# Patient Record
Sex: FEMALE | URBAN - METROPOLITAN AREA
[De-identification: names, ages, dates, MRNs, and addresses within clinical notes are randomized per-mention and may not be internally consistent; named-entity substitution may affect disease eponyms.]

---

## 2023-03-13 ENCOUNTER — NURSING HOME VISIT (OUTPATIENT)
Dept: POST ACUTE CARE | Facility: EXTERNAL LOCATION | Age: 88
End: 2023-03-13
Payer: MEDICARE

## 2023-03-13 DIAGNOSIS — I10 ESSENTIAL (PRIMARY) HYPERTENSION: ICD-10-CM

## 2023-03-13 DIAGNOSIS — N18.30 STAGE 3 CHRONIC KIDNEY DISEASE, UNSPECIFIED WHETHER STAGE 3A OR 3B CKD (MULTI): ICD-10-CM

## 2023-03-13 DIAGNOSIS — I48.91 ATRIAL FIBRILLATION, UNSPECIFIED TYPE (MULTI): ICD-10-CM

## 2023-03-13 DIAGNOSIS — R53.1 WEAKNESS GENERALIZED: ICD-10-CM
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
## 2023-03-13 DIAGNOSIS — S42.301D UNSPECIFIED FRACTURE OF SHAFT OF HUMERUS, RIGHT ARM, SUBSEQUENT ENCOUNTER FOR FRACTURE WITH ROUTINE HEALING: ICD-10-CM

## 2023-03-13 PROCEDURE — 99306 1ST NF CARE HIGH MDM 50: CPT | Performed by: INTERNAL MEDICINE

## 2023-03-13 NOTE — LETTER
Patient: Melony Fitzgerald  : 1926    Date: 3/14/23    Subjective  Chief complaint: Melony Fitzgerald is a 89 y.o. female who is a acute skilled care patient being seen and evaluated for multiple medical problems.  Patient presents for Weakness status post fall, and right humerus fracture.    HPI:  76-year-old female patient with past medical history of breast cancer, hypertension, diabetes, CKD, and A-fib presented to ED status post fall.  In ER patient complaining of right arm and shoulder pain.  Patient was found to have hypokalemia.  Imaging showed no closed fracture of right humerus.  Patient was admitted to hospital for further work-up and treatment.  During hospital course patient was seen by orthopedic surgery who recommended right upper extremity sling and no surgical intervention.  Patient was seen by hospital therapy who recommended additional outpatient therapy.  Remainder of hospital course was uncomplicated.  Patient was discharged to skilled nursing facility for weakness, deconditioning, health maintenance.        Review of Systems  All systems reviewed and negative except for what was mentioned in the HPI    Past Medical History:   Diagnosis Date   • Carcinoma in situ of breast    • CKD (chronic kidney disease)    • Diabetes mellitus (CMS/HCC)    • Hypertension    • Hypertensive cardiovascular disease    • Kidney stones    • Paroxysmal atrial fibrillation (CMS/HCC)        Past Surgical History:   Procedure Laterality Date   • HYSTERECTOMY     • MASTECTOMY         Family History   Problem Relation Name Age of Onset   • Lung disease Father         Social History     Socioeconomic History   • Marital status: None     Spouse name: None   • Number of children: None   • Years of education: None   • Highest education level: None   Occupational History   • None   Tobacco Use   • Smoking status: Never   • Smokeless tobacco: Never   Vaping Use   • Vaping status: Never Used   Substance and Sexual Activity    • Alcohol use: Never   • Drug use: Never   • Sexual activity: None   Other Topics Concern   • None   Social History Narrative   • None     Social Determinants of Health     Financial Resource Strain: Not on file   Food Insecurity: Not on file   Transportation Needs: Not on file   Physical Activity: Not on file   Stress: Not on file   Social Connections: Not on file   Intimate Partner Violence: Not on file   Housing Stability: Not on file       Vital signs: 113/56, 97.9, 66, 18    Objective  Physical Exam  Constitutional:       General: She is not in acute distress.  Eyes:      Extraocular Movements: Extraocular movements intact.   Cardiovascular:      Rate and Rhythm: Regular rhythm.   Pulmonary:      Effort: Pulmonary effort is normal.      Breath sounds: Normal breath sounds.   Abdominal:      General: Bowel sounds are normal.      Palpations: Abdomen is soft.   Musculoskeletal:      Cervical back: Neck supple.      Right lower leg: No edema.      Left lower leg: No edema.      Comments: Sling to right upper extremity   Neurological:      Mental Status: She is alert.      Motor: Weakness present.   Psychiatric:         Mood and Affect: Mood normal.         Behavior: Behavior is cooperative.         Assessment/Plan  Problem List Items Addressed This Visit       Chronic kidney disease, stage 3 unspecified (CMS/HCC)     Continue to monitor.         Essential (primary) hypertension     BP stable.  Continue to monitor blood pressure.  Continue antihypertensive meds.         Falls     History of falls.  Positive for weakness.  Continue PT OT         Unspecified atrial fibrillation (CMS/HCC)     A-fib stable.  Continue to monitor.         Unspecified fracture of shaft of humerus, right arm, subsequent encounter for fracture with routine healing     Treated on hospital course.  Continue sling.  Continue therapy.  Pain controlled.Continue as needed Roxicodone as needed         Weakness generalized     Positive for  weakness.  Continue PT OT          Medications, treatments, and labs reviewed  Continue medications and treatments as listed in [facility]        Dillon Nunez MD

## 2023-03-14 PROBLEM — N18.30 CHRONIC KIDNEY DISEASE, STAGE 3 UNSPECIFIED (MULTI): Status: ACTIVE | Noted: 2023-03-14

## 2023-03-14 PROBLEM — E11.9 TYPE 2 DIABETES MELLITUS WITHOUT COMPLICATIONS (MULTI): Status: ACTIVE | Noted: 2023-03-14

## 2023-03-14 PROBLEM — I48.91 UNSPECIFIED ATRIAL FIBRILLATION (MULTI): Status: ACTIVE | Noted: 2023-03-14

## 2023-03-14 PROBLEM — I10 ESSENTIAL (PRIMARY) HYPERTENSION: Status: ACTIVE | Noted: 2023-03-14

## 2023-03-14 PROBLEM — R53.1 WEAKNESS GENERALIZED: Status: ACTIVE | Noted: 2023-03-14

## 2023-03-14 PROBLEM — S42.301D: Status: ACTIVE | Noted: 2023-03-14
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
## 2023-03-14 NOTE — PROGRESS NOTES
Subjective   Chief complaint: Melony Fitzgerald is a 89 y.o. female who is a acute skilled care patient being seen and evaluated for multiple medical problems.  Patient presents for Weakness status post fall, and right humerus fracture.    HPI:  76-year-old female patient with past medical history of breast cancer, hypertension, diabetes, CKD, and A-fib presented to ED status post fall.  In ER patient complaining of right arm and shoulder pain.  Patient was found to have hypokalemia.  Imaging showed no closed fracture of right humerus.  Patient was admitted to hospital for further work-up and treatment.  During hospital course patient was seen by orthopedic surgery who recommended right upper extremity sling and no surgical intervention.  Patient was seen by hospital therapy who recommended additional outpatient therapy.  Remainder of hospital course was uncomplicated.  Patient was discharged to skilled nursing facility for weakness, deconditioning, health maintenance.        Review of Systems  All systems reviewed and negative except for what was mentioned in the HPI    Past Medical History:   Diagnosis Date    Carcinoma in situ of breast     CKD (chronic kidney disease)     Diabetes mellitus (CMS/HCC)     Hypertension     Hypertensive cardiovascular disease     Kidney stones     Paroxysmal atrial fibrillation (CMS/HCC)        Past Surgical History:   Procedure Laterality Date    HYSTERECTOMY      MASTECTOMY         Family History   Problem Relation Name Age of Onset    Lung disease Father         Social History     Socioeconomic History    Marital status: None     Spouse name: None    Number of children: None    Years of education: None    Highest education level: None   Occupational History    None   Tobacco Use    Smoking status: Never    Smokeless tobacco: Never   Vaping Use    Vaping status: Never Used   Substance and Sexual Activity    Alcohol use: Never    Drug use: Never    Sexual activity: None   Other Topics  Concern    None   Social History Narrative    None     Social Determinants of Health     Financial Resource Strain: Not on file   Food Insecurity: Not on file   Transportation Needs: Not on file   Physical Activity: Not on file   Stress: Not on file   Social Connections: Not on file   Intimate Partner Violence: Not on file   Housing Stability: Not on file       Vital signs: 113/56, 97.9, 66, 18    Objective   Physical Exam  Constitutional:       General: She is not in acute distress.  Eyes:      Extraocular Movements: Extraocular movements intact.   Cardiovascular:      Rate and Rhythm: Regular rhythm.   Pulmonary:      Effort: Pulmonary effort is normal.      Breath sounds: Normal breath sounds.   Abdominal:      General: Bowel sounds are normal.      Palpations: Abdomen is soft.   Musculoskeletal:      Cervical back: Neck supple.      Right lower leg: No edema.      Left lower leg: No edema.      Comments: Sling to right upper extremity   Neurological:      Mental Status: She is alert.      Motor: Weakness present.   Psychiatric:         Mood and Affect: Mood normal.         Behavior: Behavior is cooperative.         Assessment/Plan   Problem List Items Addressed This Visit       Chronic kidney disease, stage 3 unspecified (CMS/HCC)     Continue to monitor.         Essential (primary) hypertension     BP stable.  Continue to monitor blood pressure.  Continue antihypertensive meds.         Falls     History of falls.  Positive for weakness.  Continue PT OT         Unspecified atrial fibrillation (CMS/HCC)     A-fib stable.  Continue to monitor.         Unspecified fracture of shaft of humerus, right arm, subsequent encounter for fracture with routine healing     Treated on hospital course.  Continue sling.  Continue therapy.  Pain controlled.Continue as needed Roxicodone as needed         Weakness generalized     Positive for weakness.  Continue PT OT          Medications, treatments, and labs reviewed  Continue  medications and treatments as listed in [facility]

## 2023-03-14 NOTE — ASSESSMENT & PLAN NOTE
Treated on hospital course.  Continue sling.  Continue therapy.  Pain controlled.Continue as needed Roxicodone as needed

## 2023-03-15 ENCOUNTER — NURSING HOME VISIT (OUTPATIENT)
Dept: POST ACUTE CARE | Facility: EXTERNAL LOCATION | Age: 88
End: 2023-03-15
Payer: MEDICARE

## 2023-03-15 DIAGNOSIS — I10 ESSENTIAL (PRIMARY) HYPERTENSION: ICD-10-CM

## 2023-03-15 DIAGNOSIS — E11.9 TYPE 2 DIABETES MELLITUS WITHOUT COMPLICATION, UNSPECIFIED WHETHER LONG TERM INSULIN USE (MULTI): ICD-10-CM

## 2023-03-15 DIAGNOSIS — S42.301D UNSPECIFIED FRACTURE OF SHAFT OF HUMERUS, RIGHT ARM, SUBSEQUENT ENCOUNTER FOR FRACTURE WITH ROUTINE HEALING: ICD-10-CM

## 2023-03-15 DIAGNOSIS — N18.30 STAGE 3 CHRONIC KIDNEY DISEASE, UNSPECIFIED WHETHER STAGE 3A OR 3B CKD (MULTI): Primary | ICD-10-CM

## 2023-03-15 DIAGNOSIS — R53.1 WEAKNESS GENERALIZED: ICD-10-CM
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
## 2023-03-15 DIAGNOSIS — I48.91 ATRIAL FIBRILLATION, UNSPECIFIED TYPE (MULTI): ICD-10-CM

## 2023-03-15 PROCEDURE — 99308 SBSQ NF CARE LOW MDM 20: CPT | Performed by: INTERNAL MEDICINE

## 2023-03-15 NOTE — LETTER
Patient: Melony Fitzgerald  : 1926    Encounter Date: 03/15/2023    PROGRESS NOTE    Subjective  Chief complaint: Melony Fitzgerald is a 89 y.o. female who is a acute skilled care patient being seen and evaluated for weakness.    HPI:  3/15/23     Patient with right humerus fracture with sling remains nonweightbearing to RUE and no ROM to right shoulder.  Therapy working with patient on LE strengthening activities/exercises.  Also working on gait training and transfers.  Patient able to ambulate 82 feet with Paramed cane and contact-guard assist.  Transfers with contact-guard assist.      Objective  Vital signs:   116/75, 97.3, 98%  Physical Exam  Constitutional:       General: She is not in acute distress.  Eyes:      Extraocular Movements: Extraocular movements intact.   Pulmonary:      Effort: Pulmonary effort is normal.   Musculoskeletal:      Cervical back: Neck supple.   Neurological:      Mental Status: She is alert.   Psychiatric:         Mood and Affect: Mood normal.         Behavior: Behavior is cooperative.         Assessment/Plan  Problem List Items Addressed This Visit       Unspecified fracture of shaft of humerus, right arm, subsequent encounter for fracture with routine healing    Unspecified atrial fibrillation (CMS/HCC)    Type 2 diabetes mellitus without complications (CMS/HCC)    Chronic kidney disease, stage 3 unspecified (CMS/HCC) - Primary    Essential (primary) hypertension    Weakness generalized    Falls     Medications, treatments, and labs reviewed  Continue medications and treatments as listed in [facility]    Scribe Attestation  Marlin VAZQUEZ Scribe   attest that this documentation has been prepared under the direction and in the presence of Dmitry Tang DO    Provider Attestation - Scribe documentation  All medical record entries made by the Scribe were at my direction and personally dictated by me. I have reviewed the chart and agree that the record accurately reflects my  personal performance of the history, physical exam, discussion and plan.   Dmitry Tang DO        Electronically Signed By: Dmitry Tang DO   6/19/23  5:37 PM

## 2023-03-16 ENCOUNTER — NURSING HOME VISIT (OUTPATIENT)
Dept: POST ACUTE CARE | Facility: EXTERNAL LOCATION | Age: 88
End: 2023-03-16
Payer: MEDICARE

## 2023-03-16 DIAGNOSIS — R53.1 WEAKNESS GENERALIZED: ICD-10-CM

## 2023-03-16 PROCEDURE — 99308 SBSQ NF CARE LOW MDM 20: CPT | Performed by: INTERNAL MEDICINE

## 2023-03-16 NOTE — LETTER
Patient: Melony Fitzgerald  : 1926    Encounter Date: 2023    Subjective  Chief complaint: Melony Fitzgerald is a 89 y.o. female who is a acute skilled care patient being seen and evaluated for r weakness    HPI:  Patient reports currently patient is working in therapy.  Patient minimal to moderate assistance for transfers and ADLs.  No acute distress.  No new concerns.        Review of Systems  All systems reviewed and negative except for what was mentioned in the HPI    Vital signs: 136/74    Objective  Physical Exam  Constitutional:       General: She is not in acute distress.  Eyes:      Extraocular Movements: Extraocular movements intact.   Cardiovascular:      Rate and Rhythm: Regular rhythm.   Pulmonary:      Effort: Pulmonary effort is normal.      Breath sounds: Normal breath sounds.   Abdominal:      General: Bowel sounds are normal.      Palpations: Abdomen is soft.   Musculoskeletal:      Cervical back: Neck supple.      Right lower leg: No edema.      Left lower leg: No edema.   Neurological:      Mental Status: She is alert.   Psychiatric:         Mood and Affect: Mood normal.         Behavior: Behavior is cooperative.         Assessment/Plan  Problem List Items Addressed This Visit          Other    Weakness generalized     PT/OT          Medications, treatments, and labs reviewed  Continue medications and treatments as listed in [facility]    Scribe Attestation  By signing my name below, ICecilia Scribcamila   attest that this documentation has been prepared under the direction and in the presence of Dillon Nunez MD.    Provider Attestation - Scribe documentation  All medical record entries made by the Scribe were at my direction and personally dictated by me. I have reviewed the chart and agree that the record accurately reflects my personal performance of the history, physical exam, discussion and plan.      Electronically Signed By: Dillon Nunez MD   3/21/23  2:58 PM

## 2023-03-17 ENCOUNTER — NURSING HOME VISIT (OUTPATIENT)
Dept: POST ACUTE CARE | Facility: EXTERNAL LOCATION | Age: 88
End: 2023-03-17
Payer: MEDICARE

## 2023-03-17 DIAGNOSIS — R53.1 WEAKNESS GENERALIZED: ICD-10-CM

## 2023-03-17 DIAGNOSIS — S42.301D UNSPECIFIED FRACTURE OF SHAFT OF HUMERUS, RIGHT ARM, SUBSEQUENT ENCOUNTER FOR FRACTURE WITH ROUTINE HEALING: Primary | ICD-10-CM

## 2023-03-17 DIAGNOSIS — N18.30 STAGE 3 CHRONIC KIDNEY DISEASE, UNSPECIFIED WHETHER STAGE 3A OR 3B CKD (MULTI): ICD-10-CM

## 2023-03-17 DIAGNOSIS — I48.91 ATRIAL FIBRILLATION, UNSPECIFIED TYPE (MULTI): ICD-10-CM

## 2023-03-17 PROCEDURE — 99309 SBSQ NF CARE MODERATE MDM 30: CPT | Performed by: REGISTERED NURSE

## 2023-03-17 NOTE — LETTER
Patient: Melony Fitzgerald  : 1926    Encounter Date: 2023    PROGRESS NOTE    Subjective  Chief complaint: Melony Fitzgerald is a 89 y.o. female who is a acute skilled care patient being seen and evaluated for weakness.    HPI:  3/16/23 Patient reports currently patient is working in therapy.  Patient minimal to moderate assistance for transfers and ADLs.  No acute distress.  No new concerns.    3/17/23  Patient worked on gait training, bed mobility and transfers today in therapy.  Ambulated 100 feet using Marcel cane and contact-guard assist.  Perform transfers with Marcel cane and contact-guard assist.  Patient denies pain.      Objective  Vital signs:  18, 121/63, 97.6, 72, 97%  Physical Exam  Constitutional:       General: She is not in acute distress.  Pulmonary:      Effort: No respiratory distress.   Abdominal:      General: Bowel sounds are normal.   Skin:     General: Skin is warm.   Neurological:      Mental Status: Mental status is at baseline.         Assessment/Plan  Problem List Items Addressed This Visit          Circulatory    Unspecified atrial fibrillation (CMS/HCC)       Genitourinary    Chronic kidney disease, stage 3 unspecified (CMS/HCC)       Musculoskeletal    Unspecified fracture of shaft of humerus, right arm, subsequent encounter for fracture with routine healing - Primary       Other    Weakness generalized     Medications, treatments, and labs reviewed  Continue medications and treatments as listed in [facility]    Scribe Attestation  IMarlin Scribe   attest that this documentation has been prepared under the direction and in the presence of SINAN Rosenberg    Provider Attestation - Scribe documentation  All medical record entries made by the Scribe were at my direction and personally dictated by me. I have reviewed the chart and agree that the record accurately reflects my personal performance of the history, physical exam, discussion and plan.   Dmitry MOORE  SINAN Benoit        Electronically Signed By: SINAN Roesnberg   4/10/23  9:25 AM

## 2023-03-21 NOTE — PROGRESS NOTES
Subjective   Chief complaint: Melony Fitzgerald is a 89 y.o. female who is a acute skilled care patient being seen and evaluated for r weakness    HPI:  Patient reports currently patient is working in therapy.  Patient minimal to moderate assistance for transfers and ADLs.  No acute distress.  No new concerns.        Review of Systems  All systems reviewed and negative except for what was mentioned in the HPI    Vital signs: 136/74    Objective   Physical Exam  Constitutional:       General: She is not in acute distress.  Eyes:      Extraocular Movements: Extraocular movements intact.   Cardiovascular:      Rate and Rhythm: Regular rhythm.   Pulmonary:      Effort: Pulmonary effort is normal.      Breath sounds: Normal breath sounds.   Abdominal:      General: Bowel sounds are normal.      Palpations: Abdomen is soft.   Musculoskeletal:      Cervical back: Neck supple.      Right lower leg: No edema.      Left lower leg: No edema.   Neurological:      Mental Status: She is alert.   Psychiatric:         Mood and Affect: Mood normal.         Behavior: Behavior is cooperative.         Assessment/Plan   Problem List Items Addressed This Visit          Other    Weakness generalized     PT/OT          Medications, treatments, and labs reviewed  Continue medications and treatments as listed in [facility]    Scribe Attestation  By signing my name below, ICecilia Scribe   attest that this documentation has been prepared under the direction and in the presence of Dillon Nunez MD.    Provider Attestation - Scribe documentation  All medical record entries made by the Scribe were at my direction and personally dictated by me. I have reviewed the chart and agree that the record accurately reflects my personal performance of the history, physical exam, discussion and plan.

## 2023-03-30 NOTE — PROGRESS NOTES
PROGRESS NOTE    Subjective   Chief complaint: Melony Fitzgerald is a 89 y.o. female who is a acute skilled care patient being seen and evaluated for weakness.    HPI:  3/16/23 Patient reports currently patient is working in therapy.  Patient minimal to moderate assistance for transfers and ADLs.  No acute distress.  No new concerns.    3/17/23  Patient worked on gait training, bed mobility and transfers today in therapy.  Ambulated 100 feet using Marcel cane and contact-guard assist.  Perform transfers with Marcel cane and contact-guard assist.  Patient denies pain.      Objective   Vital signs:  18, 121/63, 97.6, 72, 97%  Physical Exam  Constitutional:       General: She is not in acute distress.  Pulmonary:      Effort: No respiratory distress.   Abdominal:      General: Bowel sounds are normal.   Skin:     General: Skin is warm.   Neurological:      Mental Status: Mental status is at baseline.         Assessment/Plan   Problem List Items Addressed This Visit          Circulatory    Unspecified atrial fibrillation (CMS/HCC)       Genitourinary    Chronic kidney disease, stage 3 unspecified (CMS/HCC)       Musculoskeletal    Unspecified fracture of shaft of humerus, right arm, subsequent encounter for fracture with routine healing - Primary       Other    Weakness generalized     Medications, treatments, and labs reviewed  Continue medications and treatments as listed in [facility]    Scribe Attestation  I, Derek Romero   attest that this documentation has been prepared under the direction and in the presence of SINAN Rosenberg    Provider Attestation - Scribe documentation  All medical record entries made by the Scribe were at my direction and personally dictated by me. I have reviewed the chart and agree that the record accurately reflects my personal performance of the history, physical exam, discussion and plan.   SINAN Rosenberg

## 2023-06-05 NOTE — PROGRESS NOTES
PROGRESS NOTE    Subjective   Chief complaint: Melony Fitzgerald is a 89 y.o. female who is a acute skilled care patient being seen and evaluated for weakness.    HPI:  3/15/23     Patient with right humerus fracture with sling remains nonweightbearing to RUE and no ROM to right shoulder.  Therapy working with patient on LE strengthening activities/exercises.  Also working on gait training and transfers.  Patient able to ambulate 82 feet with Paramed cane and contact-guard assist.  Transfers with contact-guard assist.      Objective   Vital signs:   116/75, 97.3, 98%  Physical Exam  Constitutional:       General: She is not in acute distress.  Eyes:      Extraocular Movements: Extraocular movements intact.   Pulmonary:      Effort: Pulmonary effort is normal.   Musculoskeletal:      Cervical back: Neck supple.   Neurological:      Mental Status: She is alert.   Psychiatric:         Mood and Affect: Mood normal.         Behavior: Behavior is cooperative.         Assessment/Plan   Problem List Items Addressed This Visit       Unspecified fracture of shaft of humerus, right arm, subsequent encounter for fracture with routine healing    Unspecified atrial fibrillation (CMS/HCC)    Type 2 diabetes mellitus without complications (CMS/HCC)    Chronic kidney disease, stage 3 unspecified (CMS/HCC) - Primary    Essential (primary) hypertension    Weakness generalized    Falls     Medications, treatments, and labs reviewed  Continue medications and treatments as listed in [facility]    Scribe Attestation  I, Derek Romero   attest that this documentation has been prepared under the direction and in the presence of Dmitry Tang DO    Provider Attestation - Scribe documentation  All medical record entries made by the Scribe were at my direction and personally dictated by me. I have reviewed the chart and agree that the record accurately reflects my personal performance of the history, physical exam, discussion and plan.    Dmitry Tang, DO